# Patient Record
Sex: MALE
[De-identification: names, ages, dates, MRNs, and addresses within clinical notes are randomized per-mention and may not be internally consistent; named-entity substitution may affect disease eponyms.]

---

## 2018-09-26 PROBLEM — Z00.00 ENCOUNTER FOR PREVENTIVE HEALTH EXAMINATION: Status: ACTIVE | Noted: 2018-09-26

## 2022-06-27 ENCOUNTER — APPOINTMENT (OUTPATIENT)
Dept: SURGERY | Facility: CLINIC | Age: 52
End: 2022-06-27
Payer: MEDICAID

## 2022-06-27 DIAGNOSIS — R22.2 LOCALIZED SWELLING, MASS AND LUMP, TRUNK: ICD-10-CM

## 2022-06-27 PROCEDURE — 99203 OFFICE O/P NEW LOW 30 MIN: CPT

## 2022-06-27 NOTE — PLAN
[FreeTextEntry1] : 51 year old man with right upper back mass; associated with pain, and some occasional right hand weakness\par - will obtain CT chest with IV contrast to evaluate for mass (was not seen on previous US)

## 2022-06-27 NOTE — HISTORY OF PRESENT ILLNESS
[de-identified] : 51 year old man presents to the office for evaluation of a mass in his upper right back.\par He states it has been there for several years; but over the last few months it has gotten larger; and over the last few weeks has been causing him more pain.\par He states the pain starts in the upper back; and radiates the the flank/anterior chest wall. Tingling/burning in sensation. Occasioanally radiates down the right arm and causes him weakness in his right hand. \par Denies any erythema, or drainage.\par Denies nausea, vomiting, fever or chills. No trauma to the location.\par Right hand dominant; works in construction.

## 2022-07-21 ENCOUNTER — APPOINTMENT (OUTPATIENT)
Dept: SURGERY | Facility: CLINIC | Age: 52
End: 2022-07-21

## 2024-05-10 ENCOUNTER — NON-APPOINTMENT (OUTPATIENT)
Age: 54
End: 2024-05-10

## 2024-11-09 ENCOUNTER — NON-APPOINTMENT (OUTPATIENT)
Age: 54
End: 2024-11-09

## 2024-11-17 ENCOUNTER — EMERGENCY (EMERGENCY)
Facility: HOSPITAL | Age: 54
LOS: 1 days | Discharge: HOME | End: 2024-11-17
Attending: EMERGENCY MEDICINE | Admitting: STUDENT IN AN ORGANIZED HEALTH CARE EDUCATION/TRAINING PROGRAM
Payer: COMMERCIAL

## 2024-11-17 VITALS
HEART RATE: 69 BPM | TEMPERATURE: 97 F | DIASTOLIC BLOOD PRESSURE: 61 MMHG | OXYGEN SATURATION: 98 % | SYSTOLIC BLOOD PRESSURE: 101 MMHG | RESPIRATION RATE: 17 BRPM

## 2024-11-17 VITALS
HEART RATE: 74 BPM | OXYGEN SATURATION: 97 % | HEIGHT: 65 IN | SYSTOLIC BLOOD PRESSURE: 143 MMHG | TEMPERATURE: 98 F | RESPIRATION RATE: 19 BRPM | DIASTOLIC BLOOD PRESSURE: 92 MMHG | WEIGHT: 218.92 LBS

## 2024-11-17 DIAGNOSIS — K64.9 UNSPECIFIED HEMORRHOIDS: ICD-10-CM

## 2024-11-17 LAB
ALBUMIN SERPL ELPH-MCNC: 3.8 G/DL — SIGNIFICANT CHANGE UP (ref 3.3–5)
ALP SERPL-CCNC: 121 U/L — HIGH (ref 40–120)
ALT FLD-CCNC: 41 U/L — SIGNIFICANT CHANGE UP (ref 12–78)
ANION GAP SERPL CALC-SCNC: 10 MMOL/L — SIGNIFICANT CHANGE UP (ref 5–17)
APTT BLD: 35.3 SEC — SIGNIFICANT CHANGE UP (ref 24.5–35.6)
AST SERPL-CCNC: 24 U/L — SIGNIFICANT CHANGE UP (ref 15–37)
BASOPHILS # BLD AUTO: 0.04 K/UL — SIGNIFICANT CHANGE UP (ref 0–0.2)
BASOPHILS NFR BLD AUTO: 0.6 % — SIGNIFICANT CHANGE UP (ref 0–2)
BILIRUB SERPL-MCNC: 0.3 MG/DL — SIGNIFICANT CHANGE UP (ref 0.2–1.2)
BUN SERPL-MCNC: 10 MG/DL — SIGNIFICANT CHANGE UP (ref 7–23)
CALCIUM SERPL-MCNC: 8.4 MG/DL — LOW (ref 8.5–10.1)
CHLORIDE SERPL-SCNC: 107 MMOL/L — SIGNIFICANT CHANGE UP (ref 96–108)
CO2 SERPL-SCNC: 23 MMOL/L — SIGNIFICANT CHANGE UP (ref 22–31)
CREAT SERPL-MCNC: 0.78 MG/DL — SIGNIFICANT CHANGE UP (ref 0.5–1.3)
EGFR: 107 ML/MIN/1.73M2 — SIGNIFICANT CHANGE UP
EOSINOPHIL # BLD AUTO: 0.26 K/UL — SIGNIFICANT CHANGE UP (ref 0–0.5)
EOSINOPHIL NFR BLD AUTO: 3.7 % — SIGNIFICANT CHANGE UP (ref 0–6)
GLUCOSE SERPL-MCNC: 106 MG/DL — HIGH (ref 70–99)
HCT VFR BLD CALC: 42.3 % — SIGNIFICANT CHANGE UP (ref 39–50)
HGB BLD-MCNC: 14.3 G/DL — SIGNIFICANT CHANGE UP (ref 13–17)
IMM GRANULOCYTES NFR BLD AUTO: 0.4 % — SIGNIFICANT CHANGE UP (ref 0–0.9)
INR BLD: 0.98 RATIO — SIGNIFICANT CHANGE UP (ref 0.85–1.16)
LYMPHOCYTES # BLD AUTO: 2.53 K/UL — SIGNIFICANT CHANGE UP (ref 1–3.3)
LYMPHOCYTES # BLD AUTO: 35.9 % — SIGNIFICANT CHANGE UP (ref 13–44)
MCHC RBC-ENTMCNC: 28.5 PG — SIGNIFICANT CHANGE UP (ref 27–34)
MCHC RBC-ENTMCNC: 33.8 G/DL — SIGNIFICANT CHANGE UP (ref 32–36)
MCV RBC AUTO: 84.4 FL — SIGNIFICANT CHANGE UP (ref 80–100)
MONOCYTES # BLD AUTO: 1 K/UL — HIGH (ref 0–0.9)
MONOCYTES NFR BLD AUTO: 14.2 % — HIGH (ref 2–14)
NEUTROPHILS # BLD AUTO: 3.19 K/UL — SIGNIFICANT CHANGE UP (ref 1.8–7.4)
NEUTROPHILS NFR BLD AUTO: 45.2 % — SIGNIFICANT CHANGE UP (ref 43–77)
NRBC # BLD: 0 /100 WBCS — SIGNIFICANT CHANGE UP (ref 0–0)
PLATELET # BLD AUTO: 214 K/UL — SIGNIFICANT CHANGE UP (ref 150–400)
POTASSIUM SERPL-MCNC: 3.6 MMOL/L — SIGNIFICANT CHANGE UP (ref 3.5–5.3)
POTASSIUM SERPL-SCNC: 3.6 MMOL/L — SIGNIFICANT CHANGE UP (ref 3.5–5.3)
PROT SERPL-MCNC: 7.2 G/DL — SIGNIFICANT CHANGE UP (ref 6–8.3)
PROTHROM AB SERPL-ACNC: 11.6 SEC — SIGNIFICANT CHANGE UP (ref 9.9–13.4)
RBC # BLD: 5.01 M/UL — SIGNIFICANT CHANGE UP (ref 4.2–5.8)
RBC # FLD: 14.2 % — SIGNIFICANT CHANGE UP (ref 10.3–14.5)
SODIUM SERPL-SCNC: 140 MMOL/L — SIGNIFICANT CHANGE UP (ref 135–145)
WBC # BLD: 7.05 K/UL — SIGNIFICANT CHANGE UP (ref 3.8–10.5)
WBC # FLD AUTO: 7.05 K/UL — SIGNIFICANT CHANGE UP (ref 3.8–10.5)

## 2024-11-17 PROCEDURE — 36415 COLL VENOUS BLD VENIPUNCTURE: CPT

## 2024-11-17 PROCEDURE — 85610 PROTHROMBIN TIME: CPT

## 2024-11-17 PROCEDURE — 85730 THROMBOPLASTIN TIME PARTIAL: CPT

## 2024-11-17 PROCEDURE — 99284 EMERGENCY DEPT VISIT MOD MDM: CPT

## 2024-11-17 PROCEDURE — 80053 COMPREHEN METABOLIC PANEL: CPT

## 2024-11-17 PROCEDURE — 99285 EMERGENCY DEPT VISIT HI MDM: CPT

## 2024-11-17 PROCEDURE — 85025 COMPLETE CBC W/AUTO DIFF WBC: CPT

## 2024-11-17 RX ORDER — OXYCODONE AND ACETAMINOPHEN 7.5; 325 MG/1; MG/1
1 TABLET ORAL ONCE
Refills: 0 | Status: DISCONTINUED | OUTPATIENT
Start: 2024-11-17 | End: 2024-11-17

## 2024-11-17 RX ORDER — ACETAMINOPHEN 500 MG
650 TABLET ORAL ONCE
Refills: 0 | Status: COMPLETED | OUTPATIENT
Start: 2024-11-17 | End: 2024-11-17

## 2024-11-17 RX ORDER — GLYCERIN, LIDOCAINE, PETROLATUM, AND PHENYLEPHRINE HYDROCHLORIDE 144; 50; 150; 2.5 MG/G; MG/G; MG/G; MG/G
1 CREAM TOPICAL
Qty: 1 | Refills: 0
Start: 2024-11-17 | End: 2024-11-23

## 2024-11-17 RX ADMIN — Medication 650 MILLIGRAM(S): at 06:29

## 2024-11-17 NOTE — CONSULT NOTE ADULT - GASTROINTESTINAL COMMENTS
+ Bright red blood noted on the skin of the buttocks bilaterally.  + Hemorrhoid noted, tenderness to palpation.  Noted to have blood clot on lateral aspect.

## 2024-11-17 NOTE — ED PROVIDER NOTE - PATIENT PORTAL LINK FT
You can access the FollowMyHealth Patient Portal offered by [facility] by registering at the following website: [email or web address]. By joining [employer]’s FollowMyHealth portal, you will also be able to view your health information using other applications (apps) compatible with our system.

## 2024-11-17 NOTE — ED PROVIDER NOTE - PHYSICAL EXAMINATION
Gen: Alert, NAD  Head/eyes: NC/AT, PERRL  ENT: airway patent  Neck: supple  Pulm/lung: Bilateral clear BS  CV/heart: RRR  GI/Abd: soft, NT/ND, +BS, no guarding/rebound tenderness  Rectal: +thrombosed external hemorrhoid  Musculoskeletal: no edema/erythema/cyanosis  Skin: no rash  Neuro: AAOx3, grossly intact

## 2024-11-17 NOTE — ED ADULT NURSE NOTE - NSFALLRISKFACTORS_ED_ALL_ED
No indicators present Pt returned call. She had called earlier as she was wanting to make sure that Meclizine prescribed for her vertigo was safe to use with the Verapamil. She since spoke to her PCP and pharmacist and was told it was safe to take together.

## 2024-11-17 NOTE — CONSULT NOTE ADULT - ASSESSMENT
55 yo male here with pmhx of ________________________, here for hemorrhoid with new onset bleeding that began yesterday.   55 yo male here with no significant pmhx here for hemorrhoid with new onset bleeding that began yesterday.

## 2024-11-17 NOTE — CONSULT NOTE ADULT - SUBJECTIVE AND OBJECTIVE BOX
COLORECTAL SURGERY CONSULT NOTE    Patient is a 54y old  Male who presents with a chief complaint of Hemorrhoid    HPI: 55 yo male with pmhx of ________________________, here for hemorrhoids.  Pt presents with wife at bedside.  States has hemorrhoid for about a week, but today developed associated bleeding.  No associated fevers, chills, SOB, chest pain, back pain.  Does note history of constipation.  States worse bleeding with toilet paper.  Had Colonoscopy 3 years ago, uncertain about name of GI specialist who he saw, but was told he had a polyp and would need 5 year follow up.        PAST MEDICAL & SURGICAL HISTORY:        MEDICATIONS  (STANDING):    MEDICATIONS  (PRN):      Allergies    No Known Allergies    Intolerances        SOCIAL HISTORY          Smoking: Yes [ ]  No [ ]   ______pk yrs          ETOH  Yes [ ]  No [ ]  Social [ ]          DRUGS:  Yes [ ]  No [ ]  if so what______________    FAMILY HISTORY:      Vital Signs Last 24 Hrs  T(C): 36.4 (17 Nov 2024 01:13), Max: 36.4 (17 Nov 2024 01:13)  T(F): 97.5 (17 Nov 2024 01:13), Max: 97.5 (17 Nov 2024 01:13)  HR: 74 (17 Nov 2024 01:13) (74 - 74)  BP: 143/92 (17 Nov 2024 01:13) (143/92 - 143/92)  BP(mean): --  RR: 19 (17 Nov 2024 01:13) (19 - 19)  SpO2: 97% (17 Nov 2024 01:13) (97% - 97%)    Parameters below as of 17 Nov 2024 01:13  Patient On (Oxygen Delivery Method): room air            LABS:                        14.3   7.05  )-----------( 214      ( 17 Nov 2024 01:56 )             42.3     11-17    140  |  107  |  10  ----------------------------<  106[H]  3.6   |  23  |  0.78    Ca    8.4[L]      17 Nov 2024 01:56    TPro  7.2  /  Alb  3.8  /  TBili  0.3  /  DBili  x   /  AST  24  /  ALT  41  /  AlkPhos  121[H]  11-17    PT/INR - ( 17 Nov 2024 01:56 )   PT: 11.6 sec;   INR: 0.98 ratio         PTT - ( 17 Nov 2024 01:56 )  PTT:35.3 sec  Urinalysis Basic - ( 17 Nov 2024 01:56 )    Color: x / Appearance: x / SG: x / pH: x  Gluc: 106 mg/dL / Ketone: x  / Bili: x / Urobili: x   Blood: x / Protein: x / Nitrite: x   Leuk Esterase: x / RBC: x / WBC x   Sq Epi: x / Non Sq Epi: x / Bacteria: x        RADIOLOGY & ADDITIONAL STUDIES:      Risks, benefits, and alternatives to treatment discussed. All questions answered with understanding. COLORECTAL SURGERY CONSULT NOTE    Patient is a 54y old  Male who presents with a chief complaint of Hemorrhoid    HPI: 55 yo male with no significant pmhx here for hemorrhoids.  Pt presents with wife at bedside.  States has hemorrhoid for about a week, but today developed associated bleeding.  No associated fevers, chills, SOB, chest pain, back pain.  Does note history of constipation.  States worse bleeding with toilet paper.  Had Colonoscopy 3 years ago, uncertain about name of GI specialist who he saw, but was told he had a polyp and would need 5 year follow up.        PAST MEDICAL & SURGICAL HISTORY:        MEDICATIONS  (STANDING):    MEDICATIONS  (PRN):      Allergies    No Known Allergies    Intolerances        SOCIAL HISTORY          Smoking: Yes [ ]  No [ X]   ______pk yrs          ETOH  Yes [ ]  No [X ]  Social [ ]          DRUGS:  Yes [ ]  No [ X]  if so what______________    FAMILY HISTORY:      Vital Signs Last 24 Hrs  T(C): 36.4 (17 Nov 2024 01:13), Max: 36.4 (17 Nov 2024 01:13)  T(F): 97.5 (17 Nov 2024 01:13), Max: 97.5 (17 Nov 2024 01:13)  HR: 74 (17 Nov 2024 01:13) (74 - 74)  BP: 143/92 (17 Nov 2024 01:13) (143/92 - 143/92)  BP(mean): --  RR: 19 (17 Nov 2024 01:13) (19 - 19)  SpO2: 97% (17 Nov 2024 01:13) (97% - 97%)    Parameters below as of 17 Nov 2024 01:13  Patient On (Oxygen Delivery Method): room air            LABS:                        14.3   7.05  )-----------( 214      ( 17 Nov 2024 01:56 )             42.3     11-17    140  |  107  |  10  ----------------------------<  106[H]  3.6   |  23  |  0.78    Ca    8.4[L]      17 Nov 2024 01:56    TPro  7.2  /  Alb  3.8  /  TBili  0.3  /  DBili  x   /  AST  24  /  ALT  41  /  AlkPhos  121[H]  11-17    PT/INR - ( 17 Nov 2024 01:56 )   PT: 11.6 sec;   INR: 0.98 ratio         PTT - ( 17 Nov 2024 01:56 )  PTT:35.3 sec  Urinalysis Basic - ( 17 Nov 2024 01:56 )    Color: x / Appearance: x / SG: x / pH: x  Gluc: 106 mg/dL / Ketone: x  / Bili: x / Urobili: x   Blood: x / Protein: x / Nitrite: x   Leuk Esterase: x / RBC: x / WBC x   Sq Epi: x / Non Sq Epi: x / Bacteria: x        RADIOLOGY & ADDITIONAL STUDIES:      Risks, benefits, and alternatives to treatment discussed. All questions answered with understanding.

## 2024-11-17 NOTE — CONSULT NOTE ADULT - PROBLEM SELECTOR RECOMMENDATION 9
Analgesia prn  Anti emetics prn  Pending [place] rectal evaluation  Monitor H/H  Will follow Analgesia prn  Anti emetics prn  Pending [place] rectal evaluation  Monitor h/h  Will follow      11/17/2024 7:16  Discussed case with Dr Irwin.  Pt can follow up outpatient with Dr Irwin in office early next week.   Sitz baths 3 times a day, witch hazel pads to hemorrhoid, Bowel regimen to reduce constipation.  Baby wipes for wiping rectal area.

## 2024-11-17 NOTE — ED PROVIDER NOTE - CARE PROVIDER_API CALL
Katerina Irwin  Colon/Rectal Surgery  [address], [place] [address]  Phone: [phone]  Fax: [phone]  Follow Up Time: 7-10 Days

## 2024-11-17 NOTE — ED ADULT NURSE NOTE - OBJECTIVE STATEMENT
L/m for patient about appointment w/ Deisy Eli for a shunt check via patient call list. -KB   Patient complaining of rectal pain for 1 week ago. Patient states having hemorrhoids. States having blood clots today.

## 2024-11-17 NOTE — ED ADULT TRIAGE NOTE - CHIEF COMPLAINT QUOTE
pt. came in from home w/ c/o rectal pain and bleeding for more than a week now, rectal bleeding just started today, NKA, took stool softener and Senna an hour ago, last BM 40 mins ago, alert and oriented.

## 2024-11-17 NOTE — ED PROVIDER NOTE - NSFOLLOWUPINSTRUCTIONS_ED_ALL_ED_FT
1. TAKE ALL MEDICATIONS AS DIRECTED.    2. FOR PAIN OR FEVER YOU CAN TAKE IBUPROFEN (MOTRIN, ADVIL) OR ACETAMINOPHEN (TYLENOL) AS NEEDED, AS DIRECTED ON PACKAGING.  3. FOLLOW UP WITH YOUR PRIMARY DOCTOR WITHIN 5 DAYS AS DIRECTED.  4. IF YOU HAD LABS OR IMAGING DONE, YOU WERE GIVEN COPIES OF ALL LABS AND/OR IMAGING RESULTS FROM YOUR ER VISIT--PLEASE TAKE THEM WITH YOU TO YOUR FOLLOW UP APPOINTMENTS.  5. IF NEEDED, CALL PATIENT ACCESS SERVICES AT [phone] TO FIND A PRIMARY CARE PHYSICIAN.  OR CALL [phone] TO MAKE AN APPOINTMENT WITH THE CLINIC.  6. RETURN TO THE ER FOR ANY WORSENING SYMPTOMS OR CONCERNS.      Please follow up outpatient with Dr Irwin in office early next week.   Sitz baths 3 times a day, witch hazel pads to hemorrhoid,  Please start high fiber diet, stool softener and laxative as needed  to reduce constipation.  Baby wipes for wiping rectal area.  Apply topical cream as directed    English    Hemorrhoids  The colon, with 3 close-ups of the rectum. One is normal and the other 2 show external and internal hemorrhoids.  Hemorrhoids are swollen veins in and around the rectum or the opening of the butt (anus). There are two types of hemorrhoids:  Internal. These occur in the veins just inside the rectum. They may poke through to the outside and become irritated and painful.  External. These occur in the veins outside the anus. They can be felt as a painful swelling or hard lump near the anus.  Most hemorrhoids do not cause severe problems. Often, they can be treated at home with diet and lifestyle changes. If home treatments do not help, you may need a procedure to shrink or remove the hemorrhoids.    What are the causes?  Hemorrhoids are caused by pressure near the anus. This pressure may be caused by:  Constipation or diarrhea.  Straining to poop.  Pregnancy.  Obesity.  Sitting or riding a bike for a long time.  Heavy lifting or other things that cause you to strain.  Anal sex.  What are the signs or symptoms?  Symptoms of this condition include:  Pain.  Anal itching or irritation.  Bleeding from the rectum.  Leakage of poop (stool).  Swelling of the anus.  One or more lumps around the anus.  How is this diagnosed?  Hemorrhoids can often be diagnosed through a visual exam. Other exams or tests may also be done, such as:  A digital rectal exam. This is when your health care provider feels inside your rectum with a gloved finger.  Anoscope. This is an exam of the anus using a small tube.  A blood test, if you have lost a lot of blood.  A sigmoidoscopy or colonoscopy. These are tests to look inside the colon using a tube with a camera on the end.  How is this treated?  In most cases, hemorrhoids can be treated at home with diet and lifestyle changes. If these changes do not help, you may need to have a procedure done. These procedures can make the hemorrhoids smaller or fully remove them. Common procedures include:  Rubber band ligation. Rubber bands are placed at the base of the hemorrhoids to cut off their blood supply.  Sclerotherapy. Medicine is put into the hemorrhoids to shrink them.  Infrared coagulation. A type of light energy is used to get rid of the hemorrhoids.  Hemorrhoidectomy surgery. The hemorrhoids are removed during surgery. Then, the veins that supply them are tied off.  Stapled hemorrhoidopexy surgery. The base of the hemorrhoid is stapled to the wall of the rectum.  Follow these instructions at home:  Medicines    Take over-the-counter and prescription medicines only as told by your provider.  Use medicated creams or medicines that are put in the rectum (suppositories) as told by your provider.  Eating and drinking    Examples of vegetables and other plant-based foods.  Eat foods that are high in fiber, such as beans, whole grains, and fresh fruits and vegetables.  Ask your provider about taking products that have fiber added to them (fiber supplements).  Reduce the amount of fat in your diet. You can do this by eating low-fat dairy products, eating less red meat, and avoiding processed foods.  Drink enough fluid to keep your pee (urine) pale yellow.  Managing pain and swelling    A bathtub partially filled with water.  Take warm sitz baths for 20 minutes, 3–4 times a day. This can help ease pain and discomfort. You may do this in a bathtub or you can use a portable sitz bath that fits over the toilet.  If told, put ice on the affected area. It may help to use ice packs between sitz baths.  Put ice in a plastic bag.  Place a towel between your skin and the bag.  Leave the ice on for 20 minutes, 2–3 times a day.  If your skin turns bright red, remove the ice right away to prevent skin damage. The risk of damage is higher if you cannot feel pain, heat, or cold.  General instructions    Exercise. Ask your provider how much and what kind of exercise is best for you. In general, you should do moderate exercise for at least 30 minutes on most days of the week (150 minutes each week). You may want to try walking, biking, or yoga.  Go to the bathroom when you have the urge to poop. Do not wait.  Avoid straining to poop.  Keep the anus dry and clean. Use wet toilet paper or moist towelettes after you poop.  Do not sit on the toilet for a long time. This can increase blood pooling and pain.  Where to find more information  [employer]: niddk.nih.gov  Contact a health care provider if:  You have more pain and swelling that do not get better with treatment.  You have trouble pooping or you are not able to poop.  You have pain or inflammation outside the area of the hemorrhoids.  Get help right away if:  You are bleeding from your rectum and you cannot get it to stop.  This information is not intended to replace advice given to you by your health care provider. Make sure you discuss any questions you have with your health care provider.    Document Revised: 08/30/2023 Document Reviewed: 08/30/2023  [employer] Patient Education © 2024 [employer]  [employer] logo  Terms and Conditions  Privacy Policy  Editorial Policy  All content on this site: Copyright © 2024 [employer], its licensors, and contributors. All rights are reserved, including those for text and data mining, AI training, and similar technologies. For all open access content, the Creative Commons licensing terms apply.  Cookies are used by this site. To decline or learn more, visit our Cookies page.  [employer]

## 2024-11-17 NOTE — ED ADULT NURSE REASSESSMENT NOTE - NS ED NURSE REASSESS COMMENT FT1
pt resting in bed. informed that he will be following up with surgery out patient and received pharmacy for medication.

## 2024-11-17 NOTE — ED PROVIDER NOTE - CLINICAL SUMMARY MEDICAL DECISION MAKING FREE TEXT BOX
54-year-old male brought in by spouse complaining of hemorrhoids for the past week now bleeding tonight.  Admits to a lot of pain.  Noticed some clots coming out from the hemorrhoid.  Denies any loss of consciousness syncope.No anticoagulation use.    r/o acute blood loss, check h/h, surgical consult for possible excision

## 2024-11-17 NOTE — ED PROVIDER NOTE - OBJECTIVE STATEMENT
54-year-old male brought in by spouse complaining of hemorrhoids for the past week now bleeding tonight.  Admits to a lot of pain.  Noticed some clots coming out from the hemorrhoid.  Denies any loss of consciousness syncope.No anticoagulation use.

## 2024-11-19 ENCOUNTER — APPOINTMENT (OUTPATIENT)
Dept: COLORECTAL SURGERY | Facility: CLINIC | Age: 54
End: 2024-11-19
Payer: COMMERCIAL

## 2024-11-19 DIAGNOSIS — K64.5 PERIANAL VENOUS THROMBOSIS: ICD-10-CM

## 2024-11-19 DIAGNOSIS — K62.5 HEMORRHAGE OF ANUS AND RECTUM: ICD-10-CM

## 2024-11-19 DIAGNOSIS — Z84.89 FAMILY HISTORY OF OTHER SPECIFIED CONDITIONS: ICD-10-CM

## 2024-11-19 PROCEDURE — 99204 OFFICE O/P NEW MOD 45 MIN: CPT | Mod: 25

## 2024-11-19 PROCEDURE — 46320 REMOVAL OF HEMORRHOID CLOT: CPT
